# Patient Record
Sex: FEMALE | Race: WHITE | Employment: OTHER | URBAN - NONMETROPOLITAN AREA
[De-identification: names, ages, dates, MRNs, and addresses within clinical notes are randomized per-mention and may not be internally consistent; named-entity substitution may affect disease eponyms.]

---

## 2018-10-21 ENCOUNTER — HOSPITAL ENCOUNTER (EMERGENCY)
Age: 28
Discharge: HOME | End: 2018-10-21
Attending: EMERGENCY MEDICINE
Payer: COMMERCIAL

## 2018-10-21 VITALS
DIASTOLIC BLOOD PRESSURE: 71 MMHG | WEIGHT: 146 LBS | BODY MASS INDEX: 23.46 KG/M2 | RESPIRATION RATE: 16 BRPM | HEIGHT: 66 IN | SYSTOLIC BLOOD PRESSURE: 119 MMHG | OXYGEN SATURATION: 100 % | HEART RATE: 125 BPM | TEMPERATURE: 98.2 F

## 2018-10-21 DIAGNOSIS — R51.9 NONINTRACTABLE HEADACHE, UNSPECIFIED CHRONICITY PATTERN, UNSPECIFIED HEADACHE TYPE: ICD-10-CM

## 2018-10-21 DIAGNOSIS — F19.11 HISTORY OF SUBSTANCE ABUSE (HCC): Primary | ICD-10-CM

## 2018-10-21 PROCEDURE — 96374 THER/PROPH/DIAG INJ IV PUSH: CPT

## 2018-10-21 PROCEDURE — 6370000000 HC RX 637 (ALT 250 FOR IP): Performed by: EMERGENCY MEDICINE

## 2018-10-21 PROCEDURE — 6360000002 HC RX W HCPCS: Performed by: EMERGENCY MEDICINE

## 2018-10-21 PROCEDURE — 99283 EMERGENCY DEPT VISIT LOW MDM: CPT

## 2018-10-21 PROCEDURE — 96361 HYDRATE IV INFUSION ADD-ON: CPT

## 2018-10-21 PROCEDURE — 2580000003 HC RX 258: Performed by: EMERGENCY MEDICINE

## 2018-10-21 RX ORDER — ACETAMINOPHEN 500 MG
1000 TABLET ORAL ONCE
Status: COMPLETED | OUTPATIENT
Start: 2018-10-21 | End: 2018-10-21

## 2018-10-21 RX ORDER — 0.9 % SODIUM CHLORIDE 0.9 %
1000 INTRAVENOUS SOLUTION INTRAVENOUS ONCE
Status: COMPLETED | OUTPATIENT
Start: 2018-10-21 | End: 2018-10-21

## 2018-10-21 RX ORDER — ONDANSETRON 2 MG/ML
4 INJECTION INTRAMUSCULAR; INTRAVENOUS EVERY 6 HOURS PRN
Status: DISCONTINUED | OUTPATIENT
Start: 2018-10-21 | End: 2018-10-22 | Stop reason: HOSPADM

## 2018-10-21 RX ADMIN — ACETAMINOPHEN 1000 MG: 500 TABLET, FILM COATED ORAL at 22:36

## 2018-10-21 RX ADMIN — ONDANSETRON HYDROCHLORIDE 4 MG: 2 SOLUTION INTRAMUSCULAR; INTRAVENOUS at 20:46

## 2018-10-21 RX ADMIN — SODIUM CHLORIDE 1000 ML: 9 INJECTION, SOLUTION INTRAVENOUS at 20:43

## 2018-10-21 ASSESSMENT — PAIN SCALES - GENERAL: PAINLEVEL_OUTOF10: 5

## 2018-10-22 NOTE — ED PROVIDER NOTES
Physical Exam       ED Triage Vitals [10/21/18 2013]   BP Temp Temp Source Pulse Resp SpO2 Height Weight   127/80 98.2 °F (36.8 °C) Oral 122 16 95 % 5' 6\" (1.676 m) 146 lb (66.2 kg)     GENERAL APPEARANCE: Awake and alert. Cooperative. No acute distress. Awake alert. HEAD: Normocephalic. Atraumatic. EYES: Sclera anicteric. The pupils are equal, round, and reactive to light. Extraocular movements are intact. ENT: Tolerates saliva. No trismus. NECK: Supple. Trachea midline. No nuchal rigidity or meningeal signs. CARDIO: RRR. Radial pulse 2+. No audible murmur. LUNGS: Respirations unlabored. CTAB and symmetrical..  ABDOMEN: Soft. Non-distended. Non-tender. EXTREMITIES: No acute deformities. No clubbing, edema, cyanosis. Distal pulses are intact and symmetrical.  Capillary be a refill is brisk. Patient does have a focal slightly erythematous area there is a right antecubital area. At the mid anterior forearm, there is a focal area of ecchymosis. SKIN: Warm and dry. No obvious lesions or discolorations. NEUROLOGICAL: No gross facial drooping. Moves all 4 extremities spontaneously. Awake alert. Speech is clear. GCS 15. No gross motor or sensory deficits. The cranial nerves III-12 grossly intact. PSYCHIATRIC: Normal mood. Diagnostics   Labs:  No results found for this visit on 10/21/18. Radiographs:  No results found. Procedures/EKG:   None    ED Course and MDM   In brief, Matthew Watts is a 33 y.o. female who presented to the emergency department with report of possible overdose. However the patient denies adamantly she has not use any illicit substance for years. The patient presented a small urine sample emergency department that was turned over to the PD by the nursing staff. The patient was not able to produce another urine sample. She requests to be discharged. She agreed to follow-up with her primary care provider. The patient did allow IV fluid hydration.   She had Tylenol for the headache. She is stable no acute distress. The patient is encouraged to return to the emergency department if any worsening or concerning symptoms. At the time of disposition, she is stable and in no acute distress. ED Medication Orders     Start Ordered     Status Ordering Provider    10/21/18 2230 10/21/18 2228  acetaminophen (TYLENOL) tablet 1,000 mg  ONCE      Last MAR action:  Given - by Lorriane Pawan on 10/21/18 at [address]    10/21/18 2030 10/21/18 2022  0.9 % sodium chloride bolus  ONCE      Last MAR action:  Stopped - by Lorriane Pawan on 10/21/18 at [address]          Final Impression      1. History of substance abuse    2.  Nonintractable headache, unspecified chronicity pattern, unspecified headache type      DISPOSITION  : Discharge     (Please note that portions of this note may have been completed with a voice recognition program. Efforts were made to edit the dictations but occasionally words are mis-transcribed.)    Aaron Tipton MD  [id number] MD  10/22/18 3502

## 2024-12-03 ENCOUNTER — HOSPITAL ENCOUNTER (EMERGENCY)
Age: 34
Discharge: HOME | End: 2024-12-03

## 2024-12-03 VITALS
TEMPERATURE: 99.1 F | HEART RATE: 99 BPM | WEIGHT: 180 LBS | OXYGEN SATURATION: 99 % | SYSTOLIC BLOOD PRESSURE: 116 MMHG | HEIGHT: 66 IN | DIASTOLIC BLOOD PRESSURE: 68 MMHG | BODY MASS INDEX: 28.93 KG/M2 | RESPIRATION RATE: 16 BRPM

## 2024-12-03 ASSESSMENT — PAIN SCALES - GENERAL: PAINLEVEL_OUTOF10: 9

## 2024-12-03 ASSESSMENT — PAIN - FUNCTIONAL ASSESSMENT: PAIN_FUNCTIONAL_ASSESSMENT: 0-10

## 2024-12-03 ASSESSMENT — LIFESTYLE VARIABLES
HOW MANY STANDARD DRINKS CONTAINING ALCOHOL DO YOU HAVE ON A TYPICAL DAY: 1 OR 2
HOW OFTEN DO YOU HAVE A DRINK CONTAINING ALCOHOL: 4 OR MORE TIMES A WEEK

## 2024-12-03 ASSESSMENT — PAIN DESCRIPTION - LOCATION: LOCATION: THROAT

## 2024-12-03 ASSESSMENT — PAIN DESCRIPTION - DESCRIPTORS: DESCRIPTORS: ACHING
